# Patient Record
Sex: MALE | Race: WHITE | URBAN - METROPOLITAN AREA
[De-identification: names, ages, dates, MRNs, and addresses within clinical notes are randomized per-mention and may not be internally consistent; named-entity substitution may affect disease eponyms.]

---

## 2017-09-07 ENCOUNTER — TRANSFERRED RECORDS (OUTPATIENT)
Dept: HEALTH INFORMATION MANAGEMENT | Facility: CLINIC | Age: 47
End: 2017-09-07

## 2017-09-07 ENCOUNTER — MEDICAL CORRESPONDENCE (OUTPATIENT)
Dept: HEALTH INFORMATION MANAGEMENT | Facility: CLINIC | Age: 47
End: 2017-09-07

## 2017-09-15 ENCOUNTER — OFFICE VISIT (OUTPATIENT)
Dept: SURGERY | Facility: CLINIC | Age: 47
End: 2017-09-15
Payer: COMMERCIAL

## 2017-09-15 VITALS
SYSTOLIC BLOOD PRESSURE: 120 MMHG | HEART RATE: 87 BPM | HEIGHT: 71 IN | WEIGHT: 223 LBS | DIASTOLIC BLOOD PRESSURE: 80 MMHG | BODY MASS INDEX: 31.22 KG/M2

## 2017-09-15 DIAGNOSIS — N50.89 TESTICULAR LUMP: Primary | ICD-10-CM

## 2017-09-15 PROCEDURE — 99203 OFFICE O/P NEW LOW 30 MIN: CPT | Performed by: SURGERY

## 2017-09-15 NOTE — PROGRESS NOTES
HPI: We are asked by Dr. Reyna to see Mr. Sweeney in consultation concerning a groin lump.   Aure is a 47 year old male who presents for evaluation of left groin lump.  Symptoms began  6 months  ago.  This is described as aching. The pain is not bothersome.  Associated symptoms include none. The patient has noticed a lump. The patient has had a previous herniorrhaphy in this location. Employment does not require heavy lifting.      Cough: No      Past Medical History:   has no past medical history on file.    Past Surgical History:  Past Surgical History:   Procedure Laterality Date     HERNIA REPAIR  As infant           Social History:  Social History     Social History     Marital status:      Spouse name: N/A     Number of children: N/A     Years of education: N/A     Occupational History     Not on file.     Social History Main Topics     Smoking status: Never Smoker     Smokeless tobacco: Not on file     Alcohol use No     Drug use: No     Sexual activity: Not on file     Other Topics Concern     Not on file     Social History Narrative        Family History:  No family history on file.      ROS:  The 10 point review of systems is negative other than noted in the HPI and above.    PE:    General- Well-developed, well-nourished, patient able to stand without difficulty.  HEENT- Normocephalic and atraumatic. Pupils equal and round.  Mucous membranes moist.  Sclera are nonicteric.   Respirations- are regular and non labored  Abdomen is abdomen is soft without significant tenderness, masses, organomegaly or guarding     Umbilicus is non-tender  Hernia- Left inguinal lump is present with valsalva, this reduces with pressure              Right inguinal hernia is not present with valsalva              The hernia is reducible              Testicles are normal   No inguinal lymphadenopathy is present  Neurologic- I find no inguinal neuropathy                  Assesment: Primary, reducible left inguinal lump.  Could be a hernia, lies posterior, could be cord mass.     Plan:    We have discussed the laparoscopic and open options for hernia repair, the choice of observation, reduction techniques, incarceration and strangulation signs, symptoms and importance as well as need to seek emergency treatment.    I will set up a testicular ultrasound to see if this represents a mass or a hernia. If this is not diagnostic, we may get a CT scan  Zhao Macias MD    Please route or send letter to:  Primary Care Provider (PCP)

## 2017-09-15 NOTE — MR AVS SNAPSHOT
"              After Visit Summary   9/15/2017    Aure Sweeney    MRN: [id number]           Patient Information     Date Of Birth          1970        Visit Information        Provider Department      9/15/2017 9:30 AM Zhao Macias MD [facility] Hernia      Care Instructions    Your bilateral testicular u/s is scheduled for 17 3:30 pm at [facility]          Follow-ups after your visit        Who to contact     If you have questions or need follow up information about today's clinic visit or your schedule please contact [facility] directly at [phone].  Normal or non-critical lab and imaging results will be communicated to you by MyChart, letter or phone within 4 business days after the clinic has received the results. If you do not hear from us within 7 days, please contact the clinic through M-Factorhart or phone. If you have a critical or abnormal lab result, we will notify you by phone as soon as possible.  Submit refill requests through GotVoice or call your pharmacy and they will forward the refill request to us. Please allow 3 business days for your refill to be completed.          Additional Information About Your Visit        MyChart Information     GotVoice lets you send messages to your doctor, view your test results, renew your prescriptions, schedule appointments and more. To sign up, go to [email or web address] . Click on \"Log in\" on the left side of the screen, which will take you to the Welcome page. Then click on \"Sign up Now\" on the right side of the page.     You will be asked to enter the access code listed below, as well as some personal information. Please follow the directions to create your username and password.     Your access code is: [email or web address]  Expires: 2017  9:41 AM     Your access code will  in 90 days. If you need help or a new code, please call your [place] clinic or [phone].   " "     Care EveryWhere ID     This is your Care EveryWhere ID. This could be used by other organizations to access your [place] medical records  [id number]        Your Vitals Were     Pulse Height BMI (Body Mass Index)             87 5' 11\" (1.803 m) 31.1 kg/m2          Blood Pressure from Last 3 Encounters:   09/15/17 120/80   02/25/11 132/91   06/18/09 128/80    Weight from Last 3 Encounters:   09/15/17 223 lb (101.2 kg)   02/25/11 231 lb (104.8 kg)   06/18/09 215 lb (97.5 kg)              Today, you had the following     No orders found for display       Primary Care Provider Office Phone # Fax #    Murphy Greco [phone]       [facility] [address] [facility] [address]        Equal Access to Services     GABY CROWDER : Hadii anila greer Soneha, waaxda luqadaha, qaybta kaalmada michelleyada, abdi crespo . So [facility] [phone].    ATENCIÓN: Si habla español, tiene a macias disposición servicios gratuitos de asistencia lingüística. Mayra al [phone].    We comply with applicable federal civil rights laws and [place] laws. We do not discriminate on the basis of race, color, national origin, age, disability sex, sexual orientation or gender identity.            Thank you!     Thank you for choosing [facility]  for your care. Our goal is always to provide you with excellent care. Hearing back from our patients is one way we can continue to improve our services. Please take a few minutes to complete the written survey that you may receive in the mail after your visit with us. Thank you!             Your Updated Medication List - Protect others around you: Learn how to safely use, store and throw away your medicines at www.disposemymeds.org.          This list is accurate as of: 9/15/17  9:41 AM.  Always use your most recent med list.                   Brand Name Dispense Instructions for use Diagnosis    NO ACTIVE MEDICATIONS      . "

## 2017-09-15 NOTE — LETTER
September 15, 2017      RE:  Aure Sweeney-:  [birth year]    HPI: We are asked by Dr. Reyna to see Mr. Sweeney in consultation concerning a groin lump.   Aure is a 47 year old male who presents for evaluation of left groin lump.  Symptoms began  6 months  ago.  This is described as aching. The pain is not bothersome.  Associated symptoms include none. The patient has noticed a lump. The patient has had a previous herniorrhaphy in this location. Employment does not require heavy lifting.      Cough: No      Past Medical History:  Has no past medical history on file.    ROS:  The 10 point review of systems is negative other than noted in the HPI and above.     PE:    General- Well-developed, well-nourished, patient able to stand without difficulty.  HEENT- Normocephalic and atraumatic. Pupils equal and round.  Mucous membranes moist.  Sclera are nonicteric.   Respirations- are regular and non labored  Abdomen is abdomen is soft without significant tenderness, masses, organomegaly or guarding                                               Umbilicus is non-tender  Hernia- Left inguinal lump is present with valsalva, this reduces with pressure              Right inguinal hernia is not present with valsalva              The hernia is reducible              Testicles are normal                        No inguinal lymphadenopathy is present  Neurologic- I find no inguinal neuropathy         Assesment: Primary, reducible left inguinal lump. Could be a hernia, lies posterior, could be cord mass.      Plan:    We have discussed the laparoscopic and open options for hernia repair, the choice of observation, reduction techniques, incarceration and strangulation signs, symptoms and importance as well as need to seek emergency treatment.    I will set up a testicular ultrasound to see if this represents a mass or a hernia. If this is not diagnostic, we may get a CT scan    Zhao Macias MD

## 2017-09-18 ENCOUNTER — TRANSFERRED RECORDS (OUTPATIENT)
Dept: HEALTH INFORMATION MANAGEMENT | Facility: CLINIC | Age: 47
End: 2017-09-18

## 2017-10-25 ENCOUNTER — OFFICE VISIT (OUTPATIENT)
Dept: UROLOGY | Facility: CLINIC | Age: 47
End: 2017-10-25
Payer: COMMERCIAL

## 2017-10-25 VITALS
DIASTOLIC BLOOD PRESSURE: 102 MMHG | BODY MASS INDEX: 32.06 KG/M2 | WEIGHT: 229 LBS | HEIGHT: 71 IN | HEART RATE: 90 BPM | SYSTOLIC BLOOD PRESSURE: 132 MMHG

## 2017-10-25 DIAGNOSIS — N50.89 PERINEAL MASS IN MALE: ICD-10-CM

## 2017-10-25 DIAGNOSIS — L72.9 SCROTAL CYST: Primary | ICD-10-CM

## 2017-10-25 PROCEDURE — 99202 OFFICE O/P NEW SF 15 MIN: CPT | Performed by: UROLOGY

## 2017-10-25 ASSESSMENT — PAIN SCALES - GENERAL: PAINLEVEL: NO PAIN (0)

## 2017-10-25 NOTE — MR AVS SNAPSHOT
After Visit Summary   10/25/2017    Aure Sweeney    MRN: [id number]           Patient Information     Date Of Birth          1970        Visit Information        Provider Department      10/25/2017 2:50 PM Aram Gr MD [facility]        Today's Diagnoses     Scrotal cyst    -  1    Perineal mass in male           Follow-ups after your visit        Your next 10 appointments already scheduled     Oct 30, 2017  6:00 PM CDT   MR PELVIS W/O & W CONTRAST with [id number]   [facility] ([facility])    [address] [place] [id number]   [phone]           Take your medicines as usual, unless your doctor tells you not to. Bring a list of your current medicines to your exam (including vitamins, minerals and over-the-counter drugs).  You will be given intravenous contrast for this exam. To prepare:   The day before your exam, drink extra fluids at least six 8-ounce glasses (unless your doctor tells you to restrict your fluids).   Have a blood test (creatinine test) within 30 days of your exam. Go to your clinic or Diagnostic Imaging Department for this test.  The MRI machine uses a strong magnet. Please wear clothes without metal (snaps, zippers). A sweatsuit works well, or we may give you a hospital gown.  Please remove any body piercings and hair extensions before you arrive. You will also remove watches, jewelry, hairpins, wallets, dentures, partial dental plates and hearing aids. You may wear contact lenses, and you may be able to wear your rings. We have a safe place to keep your personal items, but it is safer to leave them at home.   **IMPORTANT** THE INSTRUCTIONS BELOW ARE ONLY FOR THOSE PATIENTS WHO HAVE BEEN TOLD THEY WILL RECEIVE SEDATION OR GENERAL ANESTHESIA DURING THEIR MRI PROCEDURE:  IF YOU WILL RECEIVE SEDATION (take medicine to help you relax during your exam):   You must get the medicine from your  doctor before you arrive. Bring the medicine to the exam. Do not take it at home.   Arrive one hour early. Bring someone who can take you home after the test. Your medicine will make you sleepy. After the exam, you may not drive, take a bus or take a taxi by yourself.   No eating 8 hours before your exam. You may have clear liquids up until 4 hours before your exam. (Clear liquids include water, clear tea, black coffee and fruit juice without pulp.)  IF YOU WILL RECEIVE ANESTHESIA (be asleep for your exam):   Arrive 1 1/2 hours early. Bring someone who can take you home after the test. You may not drive, take a bus or take a taxi by yourself.   No eating 8 hours before your exam. You may have clear liquids up until 4 hours before your exam. (Clear liquids include water, clear tea, black coffee and fruit juice without pulp.)  Please call the Imaging Department at your exam site with any questions.              Future tests that were ordered for you today     Open Future Orders        Priority Expected Expires Ordered    MR Pelvis w/o & w Contrast Routine  10/25/2018 10/25/2017            Who to contact     If you have questions or need follow up information about today's clinic visit or your schedule please contact [facility] directly at [phone].  Normal or non-critical lab and imaging results will be communicated to you by MyChart, letter or phone within 4 business days after the clinic has received the results. If you do not hear from us within 7 days, please contact the clinic through goTennahart or phone. If you have a critical or abnormal lab result, we will notify you by phone as soon as possible.  Submit refill requests through Current Media or call your pharmacy and they will forward the refill request to us. Please allow 3 business days for your refill to be completed.          Additional Information About Your Visit        Current Media Information     Current Media lets you send  "messages to your doctor, view your test results, renew your prescriptions, schedule appointments and more. To sign up, go to [email or web address] . Click on \"Log in\" on the left side of the screen, which will take you to the Welcome page. Then click on \"Sign up Now\" on the right side of the page.     You will be asked to enter the access code listed below, as well as some personal information. Please follow the directions to create your username and password.     Your access code is: [email or web address]  Expires: 2017  9:41 AM     Your access code will  in 90 days. If you need help or a new code, please call your [place] clinic or [phone].        Care EveryWhere ID     This is your [facility] EveryWhere ID. This could be used by other organizations to access your [place] medical records  [id number]        Your Vitals Were     Pulse Height BMI (Body Mass Index)             90 1.803 m (5' 11\") 31.94 kg/m2          Blood Pressure from Last 3 Encounters:   10/25/17 (!) 132/102   09/15/17 120/80   11 132/91    Weight from Last 3 Encounters:   10/25/17 103.9 kg (229 lb)   09/15/17 101.2 kg (223 lb)   11 104.8 kg (231 lb)              We Performed the Following     UA without Microscopic        Primary Care Provider Office Phone # Fax #    Murphy Greco [phone]       [facility] [address] [facility] [address]        Equal Access to Services     BRITTANY CROWDER : Hadii aad ku hadasho Soneha, waaxda luqadaha, qaybta kaalmada nurys, abdi crespo . So [facility] [phone].    ATENCIÓN: Si habla español, tiene a macias disposición servicios gratuitos de asistencia lingüística. Llame al [phone].    We comply with applicable federal civil rights laws and [place] laws. We do not discriminate on the basis of race, color, national origin, age, disability, sex, sexual orientation, or gender identity.            Thank you!     Thank you for " choosing [facility]  for your care. Our goal is always to provide you with excellent care. Hearing back from our patients is one way we can continue to improve our services. Please take a few minutes to complete the written survey that you may receive in the mail after your visit with us. Thank you!             Your Updated Medication List - Protect others around you: Learn how to safely use, store and throw away your medicines at www.disposemymeds.org.          This list is accurate as of: 10/25/17  3:35 PM.  Always use your most recent med list.                   Brand Name Dispense Instructions for use Diagnosis    NO ACTIVE MEDICATIONS      .

## 2017-10-25 NOTE — PROGRESS NOTES
Aure Sweeney is a 47-year-old male who has noticed a growth in the left perineum for the past 6 months. This is causing no pain but is sensitive when he sits. It has increased in size by about 30% in that time. Ultrasound at [facility] shows normal testicles and cords and a hyperechoic area inferior to the scrotum.  Past medical history: Left inguinal herniorrhaphy as child, vasectomy  Medications: None  Allergies: None  Review of systems: No voiding issues  Exam: Normal appearance, normal vital signs. With spouse. Alert and oriented, normocephalic, normal respirations, neuro grossly intact. Normal scrotum, testicles and cords. Normal phallus. Soft but attached mass in left perineum. Attachment approximates anal sphincter area. Does not attached to area of  diaphragm but may be very close to left corpora cavernosum.  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 Discussed with radiologist-recommend MRI scan of the area with and without gadolinium to know its attachments in a brain structures. Surgical excision would be close to area of anal sphincter and left corpora cavernosum.  Plan: MRI scan with and without contrast early next week-call with results. Patient will probably require perineal excision-if complicated, [facility]'ll [facility] to the [facility], perhaps Jacques Patel M.D.

## 2017-10-25 NOTE — LETTER
10/25/2017      RE: Aure Sweeney  [address] [place] [address]       Aure Sweeney is a 47-year-old male who has noticed a growth in the left perineum for the past 6 months. This is causing no pain but is sensitive when he sits. It has increased in size by about 30% in that time. Ultrasound at [facility] shows normal testicles and cords and a hyperechoic area inferior to the scrotum.  Past medical history: Left inguinal herniorrhaphy as child, vasectomy  Medications: None  Allergies: None  Review of systems: No voiding issues  Exam: Normal appearance, normal vital signs. With spouse. Alert and oriented, normocephalic, normal respirations, neuro grossly intact. Normal scrotum, testicles and cords. Normal phallus. Soft but attached mass in left perineum. Attachment approximates anal sphincter area. Does not attached to area of  diaphragm but may be very close to left corpora cavernosum.  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 Discussed with radiologist-recommend MRI scan of the area with and without gadolinium to know its attachments in a brain structures. Surgical excision would be close to area of anal sphincter and left corpora cavernosum.  Plan: MRI scan with and without contrast early next week-call with results. Patient will probably require perineal excision-if complicated, we'll refer to the [place], perhaps Jacques Patel M.D.    Aram Gr MD

## 2017-10-25 NOTE — NURSING NOTE
Chief Complaint   Patient presents with     Cyst     Patient has a lump on scrotum.     Parish Marie LPN 3:02 PM October 25, 2017

## 2017-10-25 NOTE — LETTER
10/25/2017       RE: Aure Sweeney  [address] [place] [address]     Dear Colleague,    Thank you for referring your patient, Aure Sweeney, to the [facility] [place] at [facility]. Please see a copy of my visit note below.    Aure Sweeney is a 47-year-old male who has noticed a growth in the left perineum for the past 6 months. This is causing no pain but is sensitive when he sits. It has increased in size by about 30% in that time. Ultrasound at [facility] shows normal testicles and cords and a hyperechoic area inferior to the scrotum.  Past medical history: Left inguinal herniorrhaphy as child, vasectomy  Medications: None  Allergies: None  Review of systems: No voiding issues  Exam: Normal appearance, normal vital signs. With spouse. Alert and oriented, normocephalic, normal respirations, neuro grossly intact. Normal scrotum, testicles and cords. Normal phallus. Soft but attached mass in left perineum. Attachment approximates anal sphincter area. Does not attached to area of  diaphragm but may be very close to left corpora cavernosum.  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 Discussed with radiologist-recommend MRI scan of the area with and without gadolinium to know its attachments in a brain structures. Surgical excision would be close to area of anal sphincter and left corpora cavernosum.  Plan: MRI scan with and without contrast early next week-call with results. Patient will probably require perineal excision-if complicated, we'll refer to the [facility], perhaps Jacques Patel M.D.    Again, thank you for allowing me to participate in the care of your patient.      Sincerely,    Aram Gr MD

## 2017-10-30 ENCOUNTER — HOSPITAL ENCOUNTER (OUTPATIENT)
Dept: MRI IMAGING | Facility: CLINIC | Age: 47
Discharge: HOME | End: 2017-10-30
Attending: UROLOGY | Admitting: UROLOGY
Payer: COMMERCIAL

## 2017-10-30 DIAGNOSIS — N50.89 PERINEAL MASS IN MALE: ICD-10-CM

## 2017-10-30 PROCEDURE — 72197 MRI PELVIS W/O & W/DYE: CPT

## 2017-10-30 PROCEDURE — 25000128 H RX IP 250 OP 636: Performed by: UROLOGY

## 2017-10-30 PROCEDURE — A9585 GADOBUTROL INJECTION: HCPCS | Performed by: UROLOGY

## 2017-10-30 RX ORDER — GADOBUTROL 604.72 MG/ML
10 INJECTION INTRAVENOUS ONCE
Status: COMPLETED | OUTPATIENT
Start: 2017-10-30 | End: 2017-10-30

## 2017-10-30 RX ADMIN — GADOBUTROL 10 ML: 604.72 INJECTION INTRAVENOUS at 19:16

## 2017-11-13 ENCOUNTER — PRE VISIT (OUTPATIENT)
Dept: UROLOGY | Facility: CLINIC | Age: 47
End: 2017-11-13

## 2017-12-05 ENCOUNTER — PRE VISIT (OUTPATIENT)
Dept: UROLOGY | Facility: CLINIC | Age: 47
End: 2017-12-05

## 2017-12-05 NOTE — TELEPHONE ENCOUNTER
Patient with a perineal lipoma coming in for a consult. Chart reviewed and pt saw Dr. Gr, imaging is available. No need for a call.

## 2017-12-11 ENCOUNTER — OFFICE VISIT (OUTPATIENT)
Dept: UROLOGY | Facility: CLINIC | Age: 47
End: 2017-12-11
Payer: COMMERCIAL

## 2017-12-11 VITALS
HEART RATE: 95 BPM | HEIGHT: 71 IN | WEIGHT: 241 LBS | SYSTOLIC BLOOD PRESSURE: 153 MMHG | BODY MASS INDEX: 33.74 KG/M2 | DIASTOLIC BLOOD PRESSURE: 112 MMHG

## 2017-12-11 DIAGNOSIS — N50.89 PERINEAL MASS IN MALE: Primary | ICD-10-CM

## 2017-12-11 ASSESSMENT — PAIN SCALES - GENERAL: PAINLEVEL: NO PAIN (0)

## 2017-12-11 NOTE — LETTER
"12/11/2017       RE: Aure Sweeney  [address] [place] [address]     Dear Colleague,    Thank you for referring your patient, Aure Sweeney, to the [facility] UROLOGY AND INST FOR PROSTATE AND UROLOGIC CANCERS at [facility]. Please see a copy of my visit note below.      Name: Aure Sweeney    MRN: [id number]   YOB: 1970                 Chief Complaint:   Perineal mass          History of Present Illness:   Mr. Aure Sweeney is a 47 year old male seen in consultation from Dr. 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  Patient noticed an asymptomatic lump in his left perineum and sought attention.  He has had no trauma to the area and has no skin changes and no pain he has had no change in urination or defecation.           Past Medical History:     Past Medical History:   Diagnosis Date     Hernia, abdominal             Past Surgical History:     Past Surgical History:   Procedure Laterality Date     HERNIA REPAIR  As infant      TESTICLE SURGERY       VASECTOMY              Social History:     Social History   Substance Use Topics     Smoking status: Never Smoker     Smokeless tobacco: Not on file     Alcohol use No            Family History:   No family history on file.           Allergies:   No Known Allergies         Medications:     Current Outpatient Prescriptions   Medication Sig     NO ACTIVE MEDICATIONS .     No current facility-administered medications for this visit.              Review of Systems:    ROS: 14 point ROS neg other than the symptoms noted above in the HPI and PMH.          Physical Exam:   B/P: 153/112, T: Data Unavailable, P: 95, R: Data Unavailable  Estimated body mass index is 33.61 kg/(m^2) as calculated from the following:    Height as of this encounter: 1.803 m (5' 11\").    Weight as of this encounter: 109.3 kg (241 lb).  General: age-appropriate appearing male in NAD. Non-obese body habitus.  Abdomen: mild obesity , soft, " non-distended, non-tender. No organomegaly. Surgical scars include: none  : testicles normal without atrophy or masses, no hernias, penis normal without urethral discharge; there is a mobile fatty feeling mass in the left perineum about half the size of a golf ball. It is more distinct in its boundaries anteriorly and then tapers off posteriorly toward the rectum.   Rectal exam: not done  Skin: clear of rashes or ecchymoses.        Labs:    none      Imaging:    All imaging reviewed with patient.  Significant for findings consistent with a perineal lipoma.  Consistent with the findings and physical exam, the lipoma is more distinct anteriorly and tapers off posteriorly.  Anteriorly can be seen to abut the corpus spongiosum and the corpus cavernosum.  Posteriorly it trails off into an indistinct tail lateral to the rectum.      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 I assured them that at this time findings are consistent with lipoma and not liposarcoma; however, I did tell him I would present this case to tumor board and go over it with Dr. Torsten Reagan who operates on many sarcomas in order to seek a second opinion I will contact the patient by telephone before the Evely[place] holiday.     Jacques Patel MD  December 11, 2017

## 2017-12-11 NOTE — NURSING NOTE
"Chief Complaint   Patient presents with     Consult     perineal lipoma        Blood pressure (!) 153/112, pulse 95, height 1.803 m (5' 11\"), weight 109.3 kg (241 lb). Body mass index is 33.61 kg/(m^2).    Patient Active Problem List   Diagnosis     CARDIOVASCULAR SCREENING; LDL GOAL LESS THAN 160       No Known Allergies    Current Outpatient Prescriptions   Medication Sig Dispense Refill     NO ACTIVE MEDICATIONS .         Social History   Substance Use Topics     Smoking status: Never Smoker     Smokeless tobacco: Not on file     Alcohol use No       LAMAR Chaudhry  12/11/2017  4:25 PM       "

## 2017-12-11 NOTE — MR AVS SNAPSHOT
"              After Visit Summary   2017    Aure Sweeney    MRN: [id number]           Patient Information     Date Of Birth          1970        Visit Information        Provider Department      2017 4:00 PM Jacques Patel MD [facility] Urology and [facility] for Prostate and Urologic Cancers        Today's Diagnoses     Perineal mass in male    -  1       Follow-ups after your visit        Who to contact     Please call your clinic at [phone] to:    Ask questions about your health    Make or cancel appointments    Discuss your medicines    Learn about your test results    Speak to your doctor   If you have compliments or concerns about an experience at your clinic, or if you wish to file a complaint, please contact [facility] Patient Relations at [phone] or email us at [email or web address]         Additional Information About Your Visit        MyChart Information     Biocycle is an electronic gateway that provides easy, online access to your medical records. With Biocycle, you can request a clinic appointment, read your test results, renew a prescription or communicate with your care team.     To sign up for [employer] [place] visit the website at [email or web address]   You will be asked to enter the access code listed below, as well as some personal information. Please follow the directions to create your username and password.     Your access code is: [email or web address]  Expires: 2017  8:41 AM     Your access code will  in 90 days. If you need help or a new code, please contact your [facility] or call [phone] for assistance.        Care EveryWhere ID     This is your Care EveryWhere ID. This could be used by other organizations to access your [place] medical records  [id number]        Your Vitals Were     Pulse Height BMI (Body Mass Index)             95 1.803 m (5' 11\") 33.61 kg/m2          Blood Pressure " from Last 3 Encounters:   12/11/17 (!) 153/112   10/25/17 (!) 132/102   09/15/17 120/80    Weight from Last 3 Encounters:   12/11/17 109.3 kg (241 lb)   10/25/17 103.9 kg (229 lb)   09/15/17 101.2 kg (223 lb)              Today, you had the following     No orders found for display       Primary Care Provider Office Phone # Fax #    Murphy Greco [phone]       [facility] [address] [facility] [address]        Equal Access to Services     [facility] : Hadii anila ku hadasho Soomaali, waaxda luqadaha, qaybta kaalmada adejulio cesaryada, abdi crespo . So [facility] [phone].    ATENCIÓN: Si habla español, tiene a macias disposición servicios gratuitos de asistencia lingüística. Llame al [phone].    We comply with applicable federal civil rights laws and [place] laws. We do not discriminate on the basis of race, color, national origin, age, disability, sex, sexual orientation, or gender identity.            Thank you!     Thank you for choosing [facility] UROLOGY AND [facility] FOR PROSTATE AND UROLOGIC CANCERS  for your care. Our goal is always to provide you with excellent care. Hearing back from our patients is one way we can continue to improve our services. Please take a few minutes to complete the written survey that you may receive in the mail after your visit with us. Thank you!             Your Updated Medication List - Protect others around you: Learn how to safely use, store and throw away your medicines at www.disposemymeds.org.          This list is accurate as of: 12/11/17  6:42 PM.  Always use your most recent med list.                   Brand Name Dispense Instructions for use Diagnosis    NO ACTIVE MEDICATIONS      .

## 2017-12-12 NOTE — PROGRESS NOTES
"  Name: Aure Sweeney    MRN: [id number]   YOB: 1970                 Chief Complaint:   Perineal mass          History of Present Illness:   Mr. Aure Sweeney is a 47 year old male seen in consultation from Dr. 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  Patient noticed an asymptomatic lump in his left perineum and sought attention.  He has had no trauma to the area and has no skin changes and no pain he has had no change in urination or defecation.           Past Medical History:     Past Medical History:   Diagnosis Date     Hernia, abdominal             Past Surgical History:     Past Surgical History:   Procedure Laterality Date     HERNIA REPAIR  As infant      TESTICLE SURGERY       VASECTOMY              Social History:     Social History   Substance Use Topics     Smoking status: Never Smoker     Smokeless tobacco: Not on file     Alcohol use No            Family History:   No family history on file.           Allergies:   No Known Allergies         Medications:     Current Outpatient Prescriptions   Medication Sig     NO ACTIVE MEDICATIONS .     No current facility-administered medications for this visit.              Review of Systems:    ROS: 14 point ROS neg other than the symptoms noted above in the HPI and PMH.          Physical Exam:   B/P: 153/112, T: Data Unavailable, P: 95, R: Data Unavailable  Estimated body mass index is 33.61 kg/(m^2) as calculated from the following:    Height as of this encounter: 1.803 m (5' 11\").    Weight as of this encounter: 109.3 kg (241 lb).  General: age-appropriate appearing male in NAD. Non-obese body habitus.  Abdomen: mild obesity , soft, non-distended, non-tender. No organomegaly. Surgical scars include: none  : testicles normal without atrophy or masses, no hernias, penis normal without urethral discharge; there is a mobile fatty feeling mass in the left perineum about half the size of a golf ball. It is more distinct in its boundaries anteriorly and " then tapers off posteriorly toward the rectum.   Rectal exam: not done  Skin: clear of rashes or ecchymoses.        Labs:    none      Imaging:    All imaging reviewed with patient.  Significant for findings consistent with a perineal lipoma.  Consistent with the findings and physical exam, the lipoma is more distinct anteriorly and tapers off posteriorly.  Anteriorly can be seen to abut the corpus spongiosum and the corpus cavernosum.  Posteriorly it trails off into an indistinct tail lateral to the rectum.      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 I assured them that at this time findings are consistent with lipoma and not liposarcoma; however, I did tell him I would present this case to tumor board and go over it with Dr. Torsten Reagan who operates on many sarcomas in order to seek a second opinion I will contact the patient by telephone before the Evelyne holiday.     Jacques Patel MD  December 11, 2017

## 2017-12-21 ENCOUNTER — TELEPHONE (OUTPATIENT)
Dept: UROLOGY | Facility: CLINIC | Age: 47
End: 2017-12-21

## 2017-12-21 NOTE — PROGRESS NOTES
Discussed with patient that this is likely benign based on discussion with oncology, Dr Reagan and radiology's interpretation. Patient would like to come in to discuss resection.

## 2018-01-29 ENCOUNTER — PRE VISIT (OUTPATIENT)
Dept: UROLOGY | Facility: CLINIC | Age: 48
End: 2018-01-29

## 2018-01-29 NOTE — TELEPHONE ENCOUNTER
Patient with a perineal lipoma coming in to discuss resection. Chart reviewed and all records available. No need for a call.

## 2018-02-05 ENCOUNTER — OFFICE VISIT (OUTPATIENT)
Dept: UROLOGY | Facility: CLINIC | Age: 48
End: 2018-02-05
Payer: COMMERCIAL

## 2018-02-05 ENCOUNTER — ALLIED HEALTH/NURSE VISIT (OUTPATIENT)
Dept: UROLOGY | Facility: CLINIC | Age: 48
End: 2018-02-05
Payer: COMMERCIAL

## 2018-02-05 VITALS
SYSTOLIC BLOOD PRESSURE: 141 MMHG | HEIGHT: 71 IN | DIASTOLIC BLOOD PRESSURE: 95 MMHG | HEART RATE: 75 BPM | WEIGHT: 239.2 LBS | BODY MASS INDEX: 33.49 KG/M2

## 2018-02-05 DIAGNOSIS — D17.30 LIPOMA OF SKIN AND SUBCUTANEOUS TISSUE: Primary | ICD-10-CM

## 2018-02-05 RX ORDER — CEFAZOLIN SODIUM 1 G/3ML
1 INJECTION, POWDER, FOR SOLUTION INTRAMUSCULAR; INTRAVENOUS SEE ADMIN INSTRUCTIONS
Status: CANCELLED | OUTPATIENT
Start: 2018-02-05

## 2018-02-05 ASSESSMENT — PAIN SCALES - GENERAL: PAINLEVEL: NO PAIN (0)

## 2018-02-05 NOTE — PROGRESS NOTES
Pre Op Teaching Flowsheet       Pre and Post op Patient Education  Relevant Diagnosis:  Lipoma of skin and subcutaneous tissue   Teaching Topic:  Pre and post op teaching for removal of lipoma from perineum  Person Involved in teaching:  Aure Sweeney      Motivation Level:  Asks Questions: Yes  Eager to Learn:  Yes  Cooperative: Yes  Receptive (willing/able to accept information):  Yes  Patient demonstrates understanding of the following:  Date and time of surgery:  5/25/18 at 0715  Location of surgery: [facility] [place]- 5th Floor  History and Physical and any other testing necessary prior to surgery: Yes  Required time line for completion of History and Physical and any pre-op testing: Yes    NPO Guidelines: NPO per Anesthesia Guidelines    Patient demonstrates understanding of the following:  Patient understands the need for a responsible adult to drive them home and someone to stay with them for the first 24 hours post-operatively: YES   Pre-op bowel prep: No, not needed  Pre-op showering/scrub information with Hibiclens Soap: Yes  Medications to take the day of surgery:  Per PCP  Blood thinner medications discussed and when to stop (if applicable):  Yes  Diabetes medication management (if applicable):  N/A  Discussed pain control after surgery: pain scale, pain medications and pain management techniques  Infection Prevention: Patient demonstrates understanding of the following:  Patient instructed on hand hygiene:  Yes  Surgical procedure site care taught: Yes  Signs and symptoms of infection taught:  Yes  Wound care will be taught at the time of discharge.  Central venous catheter care will be taught at the time of discharge (if applicable).    Post-op follow-up:  Discussed how to contact the hospital, nurse, and clinic scheduling staff if necessary.    Instructional materials used/given/mailed:  [place] [facility] Booklet, post op teaching sheet, Map, Soap, and arrival/location  information.    Surgical instructions given to patient in clinic: Yes.    Instructional Materials given:  Before your surgery packet , Medications to avoid before surgery , Showering or Bathing instructions before surgery  and What to expect after surgery    Post-op appointment/testing scheduled per MD orders: Yes    Total time with patient: 10 minutes    Sri Perry RN  Urology Care Coordinator

## 2018-02-05 NOTE — MR AVS SNAPSHOT
After Visit Summary   2/5/2018    Aure Sweeney    MRN: [id number]           Patient Information     Date Of Birth          1970        Visit Information        Provider Department      2/5/2018 10:00 AM Jacques Patel MD [facility] Urology and [facility] for Prostate and Urologic Cancers        Today's Diagnoses     Lipoma of skin and subcutaneous tissue    -  1      Care Instructions    Schedule surgery with Dr. Patel.    It was a pleasure meeting with you today.  Thank you for allowing me and my team the privilege of caring for you today.  YOU are the reason we are here, and I truly hope we provided you with the excellent service you deserve.  Please let us know if there is anything else we can do for you so that we can be sure you are leaving completely satisfied with your care experience.              Follow-ups after your visit        Who to contact     Please call your clinic at [phone] to:    Ask questions about your health    Make or cancel appointments    Discuss your medicines    Learn about your test results    Speak to your doctor   If you have compliments or concerns about an experience at your clinic, or if you wish to file a complaint, please contact [facility] Patient Relations at [phone] or email us at [email or web address]         Additional Information About Your Visit        MyChart Information     Immunity Projectt is an electronic gateway that provides easy, online access to your medical records. With [employer] [place] [employer]uttle, you can request a clinic appointment, read your test results, renew a prescription or communicate with your care team.     To sign up for Immunity Projectt visit the website at [email or web address]   You will be asked to enter the access code listed below, as well as some personal information. Please follow the directions to create your username and password.     Your access code is: [email or web address]  Expires: 3/21/2018  9:21 AM     Your  "access code will  in 90 days. If you need help or a new code, please contact your [facility] or call [phone] for assistance.        Care EveryWhere ID     This is your Care EveryWhere ID. This could be used by other organizations to access your [place] medical records  [id number]        Your Vitals Were     Pulse Height BMI (Body Mass Index)             75 1.803 m (5' 11\") 33.36 kg/m2          Blood Pressure from Last 3 Encounters:   18 (!) 141/95   17 (!) 153/112   10/25/17 (!) 132/102    Weight from Last 3 Encounters:   18 108.5 kg (239 lb 3.2 oz)   17 109.3 kg (241 lb)   10/25/17 103.9 kg (229 lb)              We Performed the Following     Lesli-Operative Worksheet  (Urology General)        Primary Care Provider Office Phone # Fax #    Murphy Greco [phone]       [facility] [address] [facility] [address]        Equal Access to Services     [facility] : Hadii aad ku hadasho Soomaali, waaxda luqadaha, qaybta kaalmada adefunmi, abdi crespo . So [facility] [phone].    ATENCIÓN: Si habla español, tiene a macias disposición servicios gratuitos de asistencia lingüística. Mayra al [phone].    We comply with applicable federal civil rights laws and [place] laws. We do not discriminate on the basis of race, color, national origin, age, disability, sex, sexual orientation, or gender identity.            Thank you!     Thank you for choosing [facility] UROLOGY AND [facility] FOR PROSTATE AND UROLOGIC CANCERS  for your care. Our goal is always to provide you with excellent care. Hearing back from our patients is one way we can continue to improve our services. Please take a few minutes to complete the written survey that you may receive in the mail after your visit with us. Thank you!             Your Updated Medication List - Protect others around you: Learn how to safely use, store and throw " away your medicines at www.disposemymeds.org.          This list is accurate as of 2/5/18 10:53 AM.  Always use your most recent med list.                   Brand Name Dispense Instructions for use Diagnosis    NO ACTIVE MEDICATIONS      .

## 2018-02-05 NOTE — LETTER
2/5/2018       RE: Aure Sweeney  [address] [place] [address]     Dear Colleague,    Thank you for referring your patient, Aure Sweeney, to the [facility] UROLOGY AND INST FOR PROSTATE AND UROLOGIC CANCERS at [facility]. Please see a copy of my visit note below.    Mr. Sweeney returns to clinic to discuss surgical excision of the lipoma in his perineum.  Since last office visit I have reviewed his images with both our  oncologists as well as with Dr. Torsten Nayak from orthopedic oncology.  All are in agreement that this looks like a benign lipomatous mass.  The mass is most predominant in the anterior perineum close to the scrotum and then tapers off as it heads back towards Alcock's canal.    The mass bothers him very little but he and his wife are both worried about it growing over the future.  They report that has grown over the last couple of years.  I informed him that there is no urgency to removing the mass but it will likely grow over time and will become more difficult to excise over time.  They would like to proceed with surgery at this time to remove the mass and understand the risks and benefits to include bleeding, infection, changes in erectile function or ejaculatory function, changes in penile sensation or scrotal sensation.  They understand that we will take frozen sections of the mass and its margins at the time of removal and this may affect the surgical approach.    I spent 15 minutes with patient and his wife with greater than half the time in consultation and coordination of care.    Again, thank you for allowing me to participate in the care of your patient.      Sincerely,    Jacques Patel MD

## 2018-02-05 NOTE — MR AVS SNAPSHOT
After Visit Summary   2018    Aure Sweeney    MRN: [id number]           Patient Information     Date Of Birth          1970        Visit Information        Provider Department      2018 11:30 AM Nurse, Claudia Prostate Cancer [facility] Urology and [facility] for Prostate and Urologic Cancers        Today's Diagnoses     Lipoma of skin and subcutaneous tissue    -  1       Follow-ups after your visit        Who to contact     Please call your clinic at [phone] to:    Ask questions about your health    Make or cancel appointments    Discuss your medicines    Learn about your test results    Speak to your doctor   If you have compliments or concerns about an experience at your clinic, or if you wish to file a complaint, please contact [facility] Patient Relations at [phone] or email us at [email or web address]         Additional Information About Your Visit        MyChart Information     CareLuLut is an electronic gateway that provides easy, online access to your medical records. With [employer], you can request a clinic appointment, read your test results, renew a prescription or communicate with your care team.     To sign up for CareLuLut visit the website at [email or web address]   You will be asked to enter the access code listed below, as well as some personal information. Please follow the directions to create your username and password.     Your access code is: [email or web address]  Expires: 3/21/2018  9:21 AM     Your access code will  in 90 days. If you need help or a new code, please contact your [facility] or call [phone] for assistance.        Care EveryWhere ID     This is your Care EveryWhere ID. This could be used by other organizations to access your [place] medical records  [id number]         Blood Pressure from Last 3 Encounters:   18 (!) 141/95   17 (!) 153/112   10/25/17 (!) 132/102     Weight from Last 3 Encounters:   02/05/18 108.5 kg (239 lb 3.2 oz)   12/11/17 109.3 kg (241 lb)   10/25/17 103.9 kg (229 lb)              Today, you had the following     No orders found for display       Primary Care Provider Office Phone # Fax #    Murphy Greco [phone]       [facility] [address] [facility] [address]        Equal Access to Services     GABY CROWDER : Hadii aad ku hadasho Soomaali, waaxda luqadaha, qaybta kaalmada adeegyada, waxay idiin hayaan adeeg americaaragregorio la'zofia . So [facility] [phone].    ATENCIÓN: Si habla español, tiene a macias disposición servicios gratuitos de asistencia lingüística. [facility] [phone].    We comply with applicable federal civil rights laws and [place] laws. We do not discriminate on the basis of race, color, national origin, age, disability, sex, sexual orientation, or gender identity.            Thank you!     Thank you for choosing [facility] UROLOGY AND [facility] FOR PROSTATE AND UROLOGIC CANCERS  for your care. Our goal is always to provide you with excellent care. Hearing back from our patients is one way we can continue to improve our services. Please take a few minutes to complete the written survey that you may receive in the mail after your visit with us. Thank you!             Your Updated Medication List - Protect others around you: Learn how to safely use, store and throw away your medicines at www.disposemymeds.org.          This list is accurate as of 2/5/18 11:33 AM.  Always use your most recent med list.                   Brand Name Dispense Instructions for use Diagnosis    NO ACTIVE MEDICATIONS      .

## 2018-02-05 NOTE — PATIENT INSTRUCTIONS
Schedule surgery with Dr. Patel.    It was a pleasure meeting with you today.  Thank you for allowing me and my team the privilege of caring for you today.  YOU are the reason we are here, and I truly hope we provided you with the excellent service you deserve.  Please let us know if there is anything else we can do for you so that we can be sure you are leaving completely satisfied with your care experience.

## 2018-02-05 NOTE — PROGRESS NOTES
Mr. Sweeney returns to clinic to discuss surgical excision of the lipoma in his perineum.  Since last office visit I have reviewed his images with both our  oncologists as well as with Dr. Torsten Nayak from orthopedic oncology.  All are in agreement that this looks like a benign lipomatous mass.  The mass is most predominant in the anterior perineum close to the scrotum and then tapers off as it heads back towards Alcock's canal.    The mass bothers him very little but he and his wife are both worried about it growing over the future.  They report that has grown over the last couple of years.  I informed him that there is no urgency to removing the mass but it will likely grow over time and will become more difficult to excise over time.  They would like to proceed with surgery at this time to remove the mass and understand the risks and benefits to include bleeding, infection, changes in erectile function or ejaculatory function, changes in penile sensation or scrotal sensation.  They understand that we will take frozen sections of the mass and its margins at the time of removal and this may affect the surgical approach.    I spent 15 minutes with patient and his wife with greater than half the time in consultation and coordination of care.

## 2018-05-22 RX ORDER — LORATADINE 10 MG/1
10 TABLET ORAL PRN
COMMUNITY

## 2018-05-24 ENCOUNTER — ANESTHESIA EVENT (OUTPATIENT)
Dept: SURGERY | Facility: AMBULATORY SURGERY CENTER | Age: 48
End: 2018-05-24

## 2018-05-25 ENCOUNTER — ANESTHESIA (OUTPATIENT)
Dept: SURGERY | Facility: AMBULATORY SURGERY CENTER | Age: 48
End: 2018-05-25

## 2018-05-25 ENCOUNTER — HOSPITAL ENCOUNTER (OUTPATIENT)
Facility: AMBULATORY SURGERY CENTER | Age: 48
End: 2018-05-25
Attending: UROLOGY
Payer: COMMERCIAL

## 2018-05-25 ENCOUNTER — SURGERY (OUTPATIENT)
Age: 48
End: 2018-05-25

## 2018-05-25 VITALS
WEIGHT: 239.3 LBS | HEIGHT: 71 IN | OXYGEN SATURATION: 95 % | BODY MASS INDEX: 33.5 KG/M2 | SYSTOLIC BLOOD PRESSURE: 109 MMHG | RESPIRATION RATE: 12 BRPM | DIASTOLIC BLOOD PRESSURE: 67 MMHG | TEMPERATURE: 98 F

## 2018-05-25 DIAGNOSIS — D17.72 BENIGN LIPOMATOUS NEOPLASM OF OTHER GENITOURINARY ORGAN: Primary | ICD-10-CM

## 2018-05-25 RX ORDER — SODIUM CHLORIDE, SODIUM LACTATE, POTASSIUM CHLORIDE, CALCIUM CHLORIDE 600; 310; 30; 20 MG/100ML; MG/100ML; MG/100ML; MG/100ML
INJECTION, SOLUTION INTRAVENOUS CONTINUOUS
Status: CANCELLED | OUTPATIENT
Start: 2018-05-25

## 2018-05-25 RX ORDER — ONDANSETRON 2 MG/ML
INJECTION INTRAMUSCULAR; INTRAVENOUS PRN
Status: DISCONTINUED | OUTPATIENT
Start: 2018-05-25 | End: 2018-05-25

## 2018-05-25 RX ORDER — ONDANSETRON 4 MG/1
4 TABLET, ORALLY DISINTEGRATING ORAL EVERY 30 MIN PRN
Status: CANCELLED | OUTPATIENT
Start: 2018-05-25

## 2018-05-25 RX ORDER — ONDANSETRON 2 MG/ML
4 INJECTION INTRAMUSCULAR; INTRAVENOUS EVERY 30 MIN PRN
Status: CANCELLED | OUTPATIENT
Start: 2018-05-25

## 2018-05-25 RX ORDER — NALOXONE HYDROCHLORIDE 0.4 MG/ML
.1-.4 INJECTION, SOLUTION INTRAMUSCULAR; INTRAVENOUS; SUBCUTANEOUS
Status: CANCELLED | OUTPATIENT
Start: 2018-05-25 | End: 2018-05-26

## 2018-05-25 RX ORDER — NALOXONE HYDROCHLORIDE 0.4 MG/ML
.1-.4 INJECTION, SOLUTION INTRAMUSCULAR; INTRAVENOUS; SUBCUTANEOUS
Status: DISCONTINUED | OUTPATIENT
Start: 2018-05-25 | End: 2018-05-26 | Stop reason: HOSPADM

## 2018-05-25 RX ORDER — LIDOCAINE HYDROCHLORIDE 20 MG/ML
INJECTION, SOLUTION INFILTRATION; PERINEURAL PRN
Status: DISCONTINUED | OUTPATIENT
Start: 2018-05-25 | End: 2018-05-25

## 2018-05-25 RX ORDER — ACETAMINOPHEN 325 MG/1
975 TABLET ORAL ONCE
Status: COMPLETED | OUTPATIENT
Start: 2018-05-25 | End: 2018-05-25

## 2018-05-25 RX ORDER — DEXAMETHASONE SODIUM PHOSPHATE 4 MG/ML
INJECTION, SOLUTION INTRA-ARTICULAR; INTRALESIONAL; INTRAMUSCULAR; INTRAVENOUS; SOFT TISSUE PRN
Status: DISCONTINUED | OUTPATIENT
Start: 2018-05-25 | End: 2018-05-25

## 2018-05-25 RX ORDER — FENTANYL CITRATE 50 UG/ML
25-50 INJECTION, SOLUTION INTRAMUSCULAR; INTRAVENOUS
Status: CANCELLED | OUTPATIENT
Start: 2018-05-25

## 2018-05-25 RX ORDER — ONDANSETRON 4 MG/1
4 TABLET, ORALLY DISINTEGRATING ORAL EVERY 30 MIN PRN
Status: DISCONTINUED | OUTPATIENT
Start: 2018-05-25 | End: 2018-05-26 | Stop reason: HOSPADM

## 2018-05-25 RX ORDER — AMOXICILLIN 250 MG
1-2 CAPSULE ORAL 2 TIMES DAILY PRN
Qty: 30 TABLET | Refills: 0 | Status: SHIPPED | OUTPATIENT
Start: 2018-05-25

## 2018-05-25 RX ORDER — LIDOCAINE 40 MG/G
CREAM TOPICAL
Status: DISCONTINUED | OUTPATIENT
Start: 2018-05-25 | End: 2018-05-25 | Stop reason: HOSPADM

## 2018-05-25 RX ORDER — MEPERIDINE HYDROCHLORIDE 25 MG/ML
12.5 INJECTION INTRAMUSCULAR; INTRAVENOUS; SUBCUTANEOUS
Status: CANCELLED | OUTPATIENT
Start: 2018-05-25

## 2018-05-25 RX ORDER — FENTANYL CITRATE 50 UG/ML
25-50 INJECTION, SOLUTION INTRAMUSCULAR; INTRAVENOUS EVERY 5 MIN PRN
Status: DISCONTINUED | OUTPATIENT
Start: 2018-05-25 | End: 2018-05-25 | Stop reason: HOSPADM

## 2018-05-25 RX ORDER — ONDANSETRON 2 MG/ML
4 INJECTION INTRAMUSCULAR; INTRAVENOUS EVERY 30 MIN PRN
Status: DISCONTINUED | OUTPATIENT
Start: 2018-05-25 | End: 2018-05-26 | Stop reason: HOSPADM

## 2018-05-25 RX ORDER — BUPIVACAINE HYDROCHLORIDE 2.5 MG/ML
INJECTION, SOLUTION INFILTRATION; PERINEURAL PRN
Status: DISCONTINUED | OUTPATIENT
Start: 2018-05-25 | End: 2018-05-25 | Stop reason: HOSPADM

## 2018-05-25 RX ORDER — OXYCODONE HYDROCHLORIDE 5 MG/1
5-10 TABLET ORAL
Qty: 20 TABLET | Refills: 0 | Status: SHIPPED | OUTPATIENT
Start: 2018-05-25

## 2018-05-25 RX ORDER — PROPOFOL 10 MG/ML
INJECTION, EMULSION INTRAVENOUS PRN
Status: DISCONTINUED | OUTPATIENT
Start: 2018-05-25 | End: 2018-05-25

## 2018-05-25 RX ORDER — FENTANYL CITRATE 50 UG/ML
INJECTION, SOLUTION INTRAMUSCULAR; INTRAVENOUS PRN
Status: DISCONTINUED | OUTPATIENT
Start: 2018-05-25 | End: 2018-05-25

## 2018-05-25 RX ORDER — PROPOFOL 10 MG/ML
INJECTION, EMULSION INTRAVENOUS CONTINUOUS PRN
Status: DISCONTINUED | OUTPATIENT
Start: 2018-05-25 | End: 2018-05-25

## 2018-05-25 RX ORDER — SODIUM CHLORIDE, SODIUM LACTATE, POTASSIUM CHLORIDE, CALCIUM CHLORIDE 600; 310; 30; 20 MG/100ML; MG/100ML; MG/100ML; MG/100ML
INJECTION, SOLUTION INTRAVENOUS CONTINUOUS
Status: DISCONTINUED | OUTPATIENT
Start: 2018-05-25 | End: 2018-05-25 | Stop reason: HOSPADM

## 2018-05-25 RX ORDER — GABAPENTIN 300 MG/1
300 CAPSULE ORAL ONCE
Status: COMPLETED | OUTPATIENT
Start: 2018-05-25 | End: 2018-05-25

## 2018-05-25 RX ORDER — SODIUM CHLORIDE, SODIUM LACTATE, POTASSIUM CHLORIDE, CALCIUM CHLORIDE 600; 310; 30; 20 MG/100ML; MG/100ML; MG/100ML; MG/100ML
INJECTION, SOLUTION INTRAVENOUS CONTINUOUS
Status: DISCONTINUED | OUTPATIENT
Start: 2018-05-25 | End: 2018-05-26 | Stop reason: HOSPADM

## 2018-05-25 RX ORDER — OXYCODONE HYDROCHLORIDE 5 MG/1
5 TABLET ORAL EVERY 4 HOURS PRN
Status: CANCELLED | OUTPATIENT
Start: 2018-05-25

## 2018-05-25 RX ORDER — KETOROLAC TROMETHAMINE 30 MG/ML
INJECTION, SOLUTION INTRAMUSCULAR; INTRAVENOUS PRN
Status: DISCONTINUED | OUTPATIENT
Start: 2018-05-25 | End: 2018-05-25

## 2018-05-25 RX ADMIN — KETOROLAC TROMETHAMINE 30 MG: 30 INJECTION, SOLUTION INTRAMUSCULAR; INTRAVENOUS at 13:22

## 2018-05-25 RX ADMIN — ONDANSETRON 4 MG: 2 INJECTION INTRAMUSCULAR; INTRAVENOUS at 12:36

## 2018-05-25 RX ADMIN — PROPOFOL 125 MCG/KG/MIN: 10 INJECTION, EMULSION INTRAVENOUS at 13:04

## 2018-05-25 RX ADMIN — BUPIVACAINE HYDROCHLORIDE 10 ML: 2.5 INJECTION, SOLUTION INFILTRATION; PERINEURAL at 13:14

## 2018-05-25 RX ADMIN — GABAPENTIN 300 MG: 300 CAPSULE ORAL at 10:15

## 2018-05-25 RX ADMIN — SODIUM CHLORIDE, SODIUM LACTATE, POTASSIUM CHLORIDE, CALCIUM CHLORIDE: 600; 310; 30; 20 INJECTION, SOLUTION INTRAVENOUS at 12:26

## 2018-05-25 RX ADMIN — PROPOFOL 200 MG: 10 INJECTION, EMULSION INTRAVENOUS at 12:33

## 2018-05-25 RX ADMIN — PROPOFOL 150 MCG/KG/MIN: 10 INJECTION, EMULSION INTRAVENOUS at 12:33

## 2018-05-25 RX ADMIN — LIDOCAINE HYDROCHLORIDE 100 MG: 20 INJECTION, SOLUTION INFILTRATION; PERINEURAL at 12:33

## 2018-05-25 RX ADMIN — ACETAMINOPHEN 975 MG: 325 TABLET ORAL at 10:15

## 2018-05-25 RX ADMIN — FENTANYL CITRATE 50 MCG: 50 INJECTION, SOLUTION INTRAMUSCULAR; INTRAVENOUS at 13:13

## 2018-05-25 RX ADMIN — DEXAMETHASONE SODIUM PHOSPHATE 4 MG: 4 INJECTION, SOLUTION INTRA-ARTICULAR; INTRALESIONAL; INTRAMUSCULAR; INTRAVENOUS; SOFT TISSUE at 12:36

## 2018-05-25 RX ADMIN — FENTANYL CITRATE 50 MCG: 50 INJECTION, SOLUTION INTRAMUSCULAR; INTRAVENOUS at 12:36

## 2018-05-25 NOTE — IP AVS SNAPSHOT
MRN:[id number]                      After Visit Summary   5/25/2018    Aure Sweeney    MRN: [id number]           Thank you!     Thank you for choosing [facility] for your care. Our goal is always to provide you with excellent care. Hearing back from our patients is one way we can continue to improve our services. Please take a few minutes to complete the written survey that you may receive in the mail after you visit with us. Thank you!        Patient Information     Date Of Birth          1970        About your hospital stay     You were admitted on:  May 25, 2018 [facility]    You were discharged on:  May 25, 2018       Who to Call     For medical emergencies, please call 911.  For non-urgent questions about your medical care, please call your primary care provider or clinic, [phone]  For questions related to your surgery, please call your surgery clinic        Attending Provider     Provider Specialty    Jacques Patel MD Urology       Primary Care Provider Office Phone # Fax #    Murphy Greco [phone]      After Care Instructions     Discharge Instructions       Activity:  - No strenuous exercise for 6 weeks but walking and stairs are fine. You may gradually return to normal activity and normal lifting over the course of 6 weeks but take care not to strain your incisions.  Use good judgment: if something hurts then don't do it.  - You should not soak in a tub or pool until the catheter is removed but daily showering is important for healing.  - Do not strain with bowel movements.    - Do not drive until you can press the brake pedal quickly and fully without pain.   - Do not operate a motor vehicle while taking narcotic pain medications.     Diet:  You may return to your normal diet that you were taking before surgery.     Wound Care:    -- You will have an incision between your scrotum and anus.  These will be closed with stitches that will dissolve on their own and do not need to be removed.  You will not need any specific bandage on this area, but you may find wearing a small pad in your underwear can help protect from blood staining your underwear. It is recommended that you wear your scrotal support for the first week following surgery to help reduce swelling (please wash scrotal support if it becomes soiled).   You can shower and let the water run over your incisions but you should not scrub them with soap.    Medications:  1) Oxycodone - this is a narcotic pain medication which you should only need for three to five days after surgery.    2) Pericolace (Senna/docusate) - This is a stool softener to take twice daily.  The surgery, pain medications, and bladder spasm medications can lead to constipation.  You can stop or reduce the pericolace if you are having loose stools.  Other over the counter solutions such as prune juice, miralax, fiber products, senna, and dulcolax can also be used.  If you have not had a bowel movement in 3 days start over-the-counter Milk of Magnesia taken twice daily as directed until you have a good result.     Follow-up:  Please call Dr. Patel's nurse-Bev on the Monday after your surgery at [phone] to update him on your progress and so he can answer any questions you have.   Follow up as scheduled with Dr. Patel in the outpatient Urology Clinic. This appointment may be with our Physician's Assistant, or with Dr. Patel's fellow, rather than Dr. Patel. They are part of his team and very experienced in the post-operative care of his patients.      Questions:  If you have any questions here are some phone numbers and emails you can use to reach us:  - Dr. Patel's [place] Delia [phone]  - Nursing phone helpline at the Urology Clinic (8A-5P M-F): [phone] and choose option 3  - Nights or weekends, call [phone] and ask the  to page the  urology resident on call.   - For emergencies, always call 911                  Your next 10 appointments already scheduled     Jun 11, 2018 10:30 AM CDT   (Arrive by 10:15 AM)   Post-Op with Lydia Macias PA-C   [facility] ([facility])    [address]  4th Floor  [facility] [address] [phone]              Further instructions from your care team       [facility]  Home Care Following Anesthesia  For 24 hours after surgery:  1. Get plenty of rest.  A responsible adult must stay with you for at least 24 hours after you leave the surgery center.  2. Do not drive or use heavy equipment.  If you have weakness or tingling, don't drive or use heavy equipment until this feeling goes away.   3. Do not drink alcohol.   4. Avoid strenuous or risky activities.  Ask for help when climbing stairs.  5. You may feel lightheaded.  IF so, sit for a few minutes before standing.  Have someone help you get up.   6. If you have nausea (feel sick to your stomach): Drink only clear liquids such as apple juice, ginger ale, broth or 7-Up.  Rest may also help.  Be sure to drink enough fluids.  Move to a regular diet as you feel able.   7. You may have a slight fever.  Call the doctor if your fever is over 100 F (37.7 C) (taken under the tongue) or lasts longer than 24 hours.  8. You may have a dry mouth, a sore throat, muscle aches or trouble sleeping. These should go away after 24 hours.  9. Do not make important or legal decisions.               Tips for taking pain medications  To get the best pain relief possible, remember these points:    Take pain medications as directed, before pain becomes severe.    Pain medication can upset your stomach: taking it with food may help.    Constipation is a common side effect of pain medication. Drink plenty of  fluids.    Eat foods high in fiber. Take a stool  softener if recommended by your doctor or pharmacist.    Do not drink alcohol, drive or operate machinery while taking pain medications.    Ask about other ways to control pain, such as with heat, ice or relaxation.    Tylenol/Acetaminophen Consumption  To help encourage the safe use of acetaminophen, the makers of TYLENOL  have lowered the maximum daily dose for single-ingredient Extra Strength TYLENOL  (acetaminophen) products sold in the [place] from 8 pills per day (4,000 mg) to 6 pills per day (3,000 mg). The dosing interval has also changed from 2 pills every 4-6 hours to 2 pills every 6 hours.    If you feel your pain relief is insufficient, you may take Tylenol/Acetaminophen in addition to your narcotic pain medication.     Be careful not to exceed 3,000 mg of Tylenol/Acetaminophen in a 24 hour period from all sources.    If you are taking extra strength Tylenol/acetaminophen (500 mg), the maximum dose is 6 tablets in 24 hours.    If you are taking regular strength acetaminophen (325 mg), the maximum dose is 9 tablets in 24 hours.    Call a doctor for any of the followin. Signs of infection (fever, growing tenderness at the surgery site, a large amount of drainage or bleeding, severe pain, foul-smelling drainage, redness, swelling).  2. It has been over 8 to 10 hours since surgery and you are still not able to urinate (pass water).  3. Headache for over 24 hours.  4. Numbness, tingling or weakness the day after surgery (if you had spinal anesthesia).  Your doctor is:  Dr. Jacques King, Prostate and Urology: [phone]                    Or dial [phone] and ask for the resident on call for:  Prostate Urology  For emergency care, call the:  [facility]:  [phone] (TTY for hearing impaired: [phone])                Pending Results     Date and Time Order Name Status Description    2018 1336 Surgical pathology exam In process             Admission Information     Date &  "Time Provider Department Dept. Phone    2018 Jacques Patel MD [facility] [phone]      Your Vitals Were     Blood Pressure Temperature Respirations Height Weight Pulse Oximetry    112/73 97.4  F (36.3  C) (Temporal) 8 1.803 m (5' 11\") 108.5 kg (239 lb 4.8 oz) 97%    BMI (Body Mass Index)                   33.38 kg/m2           [employer] Information     [employer] is an electronic gateway that provides easy, online access to your medical records. With [employer], you can request a clinic appointment, read your test results, renew a prescription or communicate with your care team.     To sign up for [employer] visit the website at [email or web address]   You will be asked to enter the access code listed below, as well as some personal information. Please follow the directions to create your username and password.     Your access code is: [email or web address]  Expires: 2018  1:51 PM     Your access code will  in 90 days. If you need help or a new code, please contact your [facility] or call [phone] for assistance.        Care EveryWhere ID     This is your Care EveryWhere ID. This could be used by other organizations to access your [facility] medical records  [id number]        Equal Access to Services     GABY CROWDER : Hadii anila weir hadasho Socarolinaali, waaxda luqadaha, qaybta kaalmada adeegyada, abdi royal. So [facility] [phone].    ATENCIÓN: Si habla español, tiene a macias disposición servicios gratuitos de asistencia lingüística. Llame al [phone].    We comply with applicable federal civil rights laws and [place] laws. We do not discriminate on the basis of race, color, national origin, age, disability, sex, sexual orientation, or gender identity.               Review of your medicines      START taking        Dose / Directions    oxyCODONE IR 5 MG tablet   Commonly known as:  ROXICODONE   Used for:  Benign " lipomatous neoplasm of other genitourinary organ        Dose:  5-10 mg   Take 1-2 tablets (5-10 mg) by mouth every 3 hours as needed for other (Moderate to Severe Pain)   Quantity:  20 tablet   Refills:  0       senna-docusate 8.6-50 MG per tablet   Commonly known as:  SENOKOT-S;PERICOLACE   Used for:  Benign lipomatous neoplasm of other genitourinary organ        Dose:  1-2 tablet   Take 1-2 tablets by mouth 2 times daily as needed for constipation   Quantity:  30 tablet   Refills:  0         CONTINUE these medicines which have NOT CHANGED        Dose / Directions    loratadine 10 MG tablet   Commonly known as:  CLARITIN   Indication:  as needed for allergies        Dose:  10 mg   Take 10 mg by mouth as needed for allergies   Refills:  0            Where to get your medicines      These medications were sent to [facility] [address] [facility] [address]  [address] [place] [address] [address] [place] [address]    Hours:  TRANSPLANT PHONE NUMBER [phone] Phone:  [phone]     senna-docusate 8.6-50 MG per tablet         Some of these will need a paper prescription and others can be bought over the counter. Ask your nurse if you have questions.     Bring a paper prescription for each of these medications     oxyCODONE IR 5 MG tablet                Protect others around you: Learn how to safely use, store and throw away your medicines at www.disposemymeds.org.        Information about OPIOIDS     PRESCRIPTION OPIOIDS: WHAT YOU NEED TO KNOW   You have a prescription for an opioid (narcotic) pain medicine. Opioids can cause addiction. If you have a history of chemical dependency of any type, you are at a higher risk of becoming addicted to opioids. Only take this medicine after all other options have been tried. Take it for as short a time and as few doses as possible.     Do not:    Drive. If you drive while taking these medicines, you could be arrested for driving under the  influence (DUI).    Operate heavy machinery    Do any other dangerous activities while taking these medicines.     Drink any alcohol while taking these medicines.      Take with any other medicines that contain acetaminophen. Read all labels carefully. Look for the word  acetaminophen  or  Tylenol.  Ask your pharmacist if you have questions or are unsure.    Store your pills in a secure place, locked if possible. We will not replace any lost or stolen medicine. If you don t finish your medicine, please throw away (dispose) as directed by your pharmacist. The [place] Pollution Control Agency has more information about safe disposal: https://www.pca.[facility].mn.us/living-green/managing-unwanted-medications    All opioids tend to cause constipation. Drink plenty of water and eat foods that have a lot of fiber, such as fruits, vegetables, prune juice, apple juice and high-fiber cereal. Take a laxative (Miralax, milk of magnesia, Colace, Senna) if you don t move your bowels at least every other day.              Medication List: This is a list of all your medications and when to take them. Check marks below indicate your daily home schedule. Keep this list as a reference.      Medications           Morning Afternoon Evening Bedtime As Needed    loratadine 10 MG tablet   Commonly known as:  CLARITIN   Take 10 mg by mouth as needed for allergies                                oxyCODONE IR 5 MG tablet   Commonly known as:  ROXICODONE   Take 1-2 tablets (5-10 mg) by mouth every 3 hours as needed for other (Moderate to Severe Pain)                                senna-docusate 8.6-50 MG per tablet   Commonly known as:  SENOKOT-S;PERICOLACE   Take 1-2 tablets by mouth 2 times daily as needed for constipation

## 2018-05-25 NOTE — IP AVS SNAPSHOT
[facility]    [address] [place] [address]    Phone:  [phone]    Fax:  [phone]                                       After Visit Summary   5/25/2018    Aure Sweeney    MRN: [id number]           After Visit Summary Signature Page     I have received my discharge instructions, and my questions have been answered. I have discussed any challenges I see with this plan with the nurse or doctor.    ..........................................................................................................................................  Patient/Patient Representative Signature      ..........................................................................................................................................  Patient Representative Print Name and Relationship to Patient    ..................................................               ................................................  Date                                            Time    ..........................................................................................................................................  Reviewed by Signature/Title    ...................................................              ..............................................  Date                                                            Time

## 2018-05-25 NOTE — ANESTHESIA CARE TRANSFER NOTE
Patient: Aure Sweeney    Procedure(s):  Removal of Lipoma from Perineum - Wound Class: II-Clean Contaminated    Diagnosis: Lipoma  Diagnosis Additional Information: No value filed.    Anesthesia Type:   MAC     Note:  Airway :Face Mask  Patient transferred to:PACU  Comments: VSS and WNL, denies pain, no PONV, report to Judy CRANEHandoff Report: Identifed the Patient, Identified the Reponsible Provider, Reviewed the pertinent medical history, Discussed the surgical course, Reviewed Intra-OP anesthesia mangement and issues during anesthesia, Set expectations for post-procedure period and Allowed opportunity for questions and acknowledgement of understanding      Vitals: (Last set prior to Anesthesia Care Transfer)    CRNA VITALS  5/25/2018 1324 - 5/25/2018 1359      5/25/2018             Pulse: 84    SpO2: 98 %    Resp Rate (observed): 8    Resp Rate (set): 10                Electronically Signed By: SHEEBA Engel CRNA  May 25, 2018  1:59 PM

## 2018-05-25 NOTE — DISCHARGE INSTRUCTIONS
[facility]  Home Care Following Anesthesia  For 24 hours after surgery:  1. Get plenty of rest.  A responsible adult must stay with you for at least 24 hours after you leave the surgery center.  2. Do not drive or use heavy equipment.  If you have weakness or tingling, don't drive or use heavy equipment until this feeling goes away.   3. Do not drink alcohol.   4. Avoid strenuous or risky activities.  Ask for help when climbing stairs.  5. You may feel lightheaded.  IF so, sit for a few minutes before standing.  Have someone help you get up.   6. If you have nausea (feel sick to your stomach): Drink only clear liquids such as apple juice, ginger ale, broth or 7-Up.  Rest may also help.  Be sure to drink enough fluids.  Move to a regular diet as you feel able.   7. You may have a slight fever.  Call the doctor if your fever is over 100 F (37.7 C) (taken under the tongue) or lasts longer than 24 hours.  8. You may have a dry mouth, a sore throat, muscle aches or trouble sleeping. These should go away after 24 hours.  9. Do not make important or legal decisions.               Tips for taking pain medications  To get the best pain relief possible, remember these points:    Take pain medications as directed, before pain becomes severe.    Pain medication can upset your stomach: taking it with food may help.    Constipation is a common side effect of pain medication. Drink plenty of  fluids.    Eat foods high in fiber. Take a stool softener if recommended by your doctor or pharmacist.    Do not drink alcohol, drive or operate machinery while taking pain medications.    Ask about other ways to control pain, such as with heat, ice or relaxation.    Tylenol/Acetaminophen Consumption  To help encourage the safe use of acetaminophen, the makers of TYLENOL  have lowered the maximum daily dose for single-ingredient Extra Strength TYLENOL  (acetaminophen) products sold in the [place] from 8  pills per day (4,000 mg) to 6 pills per day (3,000 mg). The dosing interval has also changed from 2 pills every 4-6 hours to 2 pills every 6 hours.    If you feel your pain relief is insufficient, you may take Tylenol/Acetaminophen in addition to your narcotic pain medication.     Be careful not to exceed 3,000 mg of Tylenol/Acetaminophen in a 24 hour period from all sources.    If you are taking extra strength Tylenol/acetaminophen (500 mg), the maximum dose is 6 tablets in 24 hours.    If you are taking regular strength acetaminophen (325 mg), the maximum dose is 9 tablets in 24 hours.    Call a doctor for any of the followin. Signs of infection (fever, growing tenderness at the surgery site, a large amount of drainage or bleeding, severe pain, foul-smelling drainage, redness, swelling).  2. It has been over 8 to 10 hours since surgery and you are still not able to urinate (pass water).  3. Headache for over 24 hours.  4. Numbness, tingling or weakness the day after surgery (if you had spinal anesthesia).  Your doctor is:  Dr. Jacques King, Prostate and Urology: [phone]                    Or dial [phone] and ask for the resident on call for:  Prostate Urology  For emergency care, call the:  [facility]:  [phone] (TTY for hearing impaired: [phone])

## 2018-05-25 NOTE — OP NOTE
OPERATIVE REPORT  05/25/2018    PREOPERATIVE DIAGNOSIS: Perineal lipoma    POSTOPERATIVE DIAGNOSIS: Perineal lipoma    PROCEDURES:   1. Excision of perineal lipoma    SURGEON:  Jacques Patel MD, MS  ASSISTANT:  Ambrocio Estrada MD (Resident)    Jonny Prather MD (Fellow)    ANESTHEISA: Ceci    ESTIMATED BLOOD LOSS: 5 mL.   SPECIMENS: Perineal lipoma    INDICATIONS: Mr. Aure Sweeney is a 48 year old gentleman with an enlarging perineal mass. 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 He desires elective resection of this. He understood the risks to include but not be limited to bleeding, infection, penile pain or numbness, scrotal pain or numbness, change in erectile or ejaculatory function, lower extremity neuropathy, deep venous thrombosis. He wished to proceed.     DESCRIPTION OF PROCEDURE:   After informed consent was obtained and preoperative antibiotics were given, the patient was taken to the operating room and placed supine on the operating table. General anesthesia was induced. He was intubated. The patient was placed in the high lithotomy position. The perineum was shaved, prepped and draped in the usual sterile fashion.     A vertical midline incision was made in the perineum and carried down through Colle's fascia. The lipoma was encountered lateral to the bulbospongious and its associated vein and nerve on the left side. The lipoma was then carefully dissected posteriorly and inferiorly using a combination of blunt and sharp cautery. Care was taken to avoid damage to any underlying structures. The lipoma tracked inferolaterally into Alcock's canal where it gradually thinned into a narrow stalk. This stalk was ligated using a 2-0 silk tie and was amputated, taking care to achieve hemostasis. The specimen was removed and sent for pathology. The wound was copiously irrigated. Colles fascia was  "closed with a running 3-0 Vicryl suture. Skin was closed with interrupted 4-0 Vicryl sutures.   Xeroform, fluffs and scrotal support were placed. The patient was awakened from anesthesia, transferred to [facility] and then to the postanesthesia care unit in stable condition. There were no complications.      Wt Readings from Last 5 Encounters:   05/25/18 108.5 kg (239 lb 4.8 oz)   02/05/18 108.5 kg (239 lb 3.2 oz)   12/11/17 109.3 kg (241 lb)   10/25/17 103.9 kg (229 lb)   09/15/17 101.2 kg (223 lb)     Ht Readings from Last 2 Encounters:   05/25/18 1.803 m (5' 11\")   02/05/18 1.803 m (5' 11\")     Estimated body mass index is 33.38 kg/(m^2) as calculated from the following:    Height as of this encounter: 1.803 m (5' 11\").    Weight as of this encounter: 108.5 kg (239 lb 4.8 oz).    Ambrocio Estrada MD    "

## 2018-05-25 NOTE — ANESTHESIA POSTPROCEDURE EVALUATION
Patient: Aure Sweeney    Procedure(s):  Removal of Lipoma from Perineum - Wound Class: II-Clean Contaminated    Diagnosis:Lipoma  Diagnosis Additional Information: No value filed.    Anesthesia Type:  MAC    Note:  Anesthesia Post Evaluation    Patient location during evaluation: PACU  Patient participation: Able to fully participate in evaluation  Level of consciousness: awake and alert  Pain management: adequate  Airway patency: patent  Cardiovascular status: hemodynamically stable  Respiratory status: acceptable  Hydration status: stable  PONV: none     Anesthetic complications: None          Last vitals:  Vitals:    05/25/18 1400 05/25/18 1415 05/25/18 1430   BP: 112/73 102/64 109/67   Resp: 8 12 12   Temp: 36.3  C (97.4  F) 36.7  C (98  F) 36.7  C (98  F)   SpO2: 97% 96% 95%         Electronically Signed By: James Agustin MD  May 25, 2018  2:59 PM

## 2018-05-25 NOTE — ANESTHESIA PREPROCEDURE EVALUATION
Anesthesia Evaluation     .             ROS/MED HX    ENT/Pulmonary:  - neg pulmonary ROS     Neurologic:  - neg neurologic ROS     Cardiovascular:  - neg cardiovascular ROS       METS/Exercise Tolerance:     Hematologic:  - neg hematologic  ROS       Musculoskeletal:  - neg musculoskeletal ROS       GI/Hepatic:     (+) GERD Asymptomatic on medication,       Renal/Genitourinary:  - ROS Renal section negative       Endo:  - neg endo ROS       Psychiatric:  - neg psychiatric ROS       Infectious Disease:  - neg infectious disease ROS       Malignancy:      - no malignancy   Other:    - neg other ROS                 Physical Exam  Normal systems: cardiovascular, pulmonary and dental    Airway   Mallampati: I  TM distance: >3 FB  Neck ROM: full    Dental     Cardiovascular   Rhythm and rate: regular and normal      Pulmonary    breath sounds clear to auscultation                    Anesthesia Plan      History & Physical Review  History and physical reviewed and following examination; no interval change.    ASA Status:  2 .    NPO Status:  > 8 hours    Plan for MAC with Propofol and Intravenous induction. Maintenance will be Balanced.  Reason for MAC:  Deep or markedly invasive procedure (G8)  PONV prophylaxis:  Ondansetron (or other 5HT-3) and Dexamethasone or Solumedrol       Postoperative Care  Postoperative pain management:  Oral pain medications and Multi-modal analgesia.      Consents  Anesthetic plan, risks, benefits and alternatives discussed with:  Patient..                          .

## 2018-05-25 NOTE — LETTER
"Aure Sweeney   [address] [facility] [address]        Dear Aure     I am writing you concerning your recent test results which are normal.    Results for orders placed or performed during the hospital encounter of 05/25/18   Surgical pathology exam   Result Value Ref Range    Copath Report       Patient Name: AURE SWEENEY  MR#: [id number]  Specimen #: [id number]  Collected: 5/25/2018  Received: 5/25/2018  Reported: 5/29/2018 18:05  Ordering Phy(s): LILLY BRADLEY    For improved result formatting, select 'View Enhanced Report Format' under   Linked Documents section.    SPECIMEN(S):  Perineal lipoma    FINAL DIAGNOSIS:  Soft tissue, perineal mass, excision:  - Low grade adipocytic neoplasm; see comment    COMMENT:  Given the deep location, large size and subtle nuclear atypia present in   this specimen, FISH study for MDM2  amplification has been ordered to evaluate for the possibility of an   atypical lipomatous tumor/well  differentiated liposarcoma.  The results will be reported in an addendum.    I have personally reviewed all specimens and/or slides, including the   listed special stains, and used them  with my medical judgement to determine or confirm the final diagnosis.    Electronically signed out by:    Sharron Kam M.D., [facility]    CLINICAL HISTORY:  [age] year old male with an enlarging perineal mass.  GROSS:  The specimen is received in formalin with proper patient identification,   labeled \"perineal lipoma\".  The  specimen consists of a 56.8 g, 10.1 x 4.1 x 4.1 cm partially disrupted   yellow-tan fibroadipose tissue mass.  The intact portions of capsule are inked black, and the specimen is   serially sectioned to reveal a homogeneous  yellow tan fibroadipose cut surface with no areas of firmness or   hemorrhage.  Representative sections are  submitted in cassettes A1-A5. (Dictated by: Rosalina Mcclellan 5/25/2018 03:22   PM)    MICROSCOPIC:  Microscopic examination was " performed.    CPT Codes:  A: 91065-KZ6    TESTING LAB LOCATION:  [facility], [address]   [address] [phone]    COLLECTION SITE:  Client: [facility]  Location: OR (B)       FISH   Result Value Ref Range    Copath Report       Patient Name: MEGGAN VAIL  MR#: [id number]  Specimen #: [id number]  Collected: 5/25/2018 13:20  Received: 5/30/2018 11:45  Reported: 6/7/2018 19:57  Ordering Phy(s): LOREN LEMOS  Additional Phy(s): LILLY BRADLEY    For improved result formatting, select 'View Enhanced Report Format' under   Linked Documents section.  __________________________________________    TEST(S) REQUESTED:  FISH Interphase    SPECIMEN DESCRIPTION:  Perineal Lipoma, Paraffin Embedded    CLINICAL COMMENTS:  [id number]    METHODS:  FISH was performed on a paraffin embedded perineal mass with Abbott   Molecular single color probe to MDM2  (12q15) with control probe to D12Z1 (centromere 12).    RESULTS: No evidence of amplification of MDM2    INTERPRETATION:    Avg. number of MDM2 signals/nucleus: 2.0  Avg. number of MORRIS 12 signals/nucleus: 1.9  Amplified Range: MDM2/MORRIS 12 ratio >2.0    Ratio of MDM2/MORRIS 12 signals:   1.0    INTERPRETATION:  None (0%) of the interphase cells examined had a signal pattern  indicative   of amplification of MDM2, as has  been documented in atypical lipomatous tumor/well-differentiated   liposarcoma.    ADDITIONAL COMMENTS:  Analyte Specific Reagents (ASRs) are used in many laboratory tests   necessary for standard medical care and  generally do not require FDA approval.  This test was developed and its   performance characteristics determined  by the [facility]   [facility].  It has not been cleared or  approved by the U.S. Food and Drug Administration.    Electronically Signed Out By:  Whit Rivera  "M.D., [facility] Codes:  A: 04944-DZBP, 71872-CHAPM    TESTING LAB LOCATION:  [facility]   [facility], [address] [address] [phone]    COLLECTION SITE:  Client:  [facility]  Location:  UCOR (B)       Resulted Orders   Surgical pathology exam   Result Value Ref Range    Copath Report       Patient Name: MEGGAN VAIL  MR#: [id number]  Specimen #: [id number]  Collected: 5/25/2018  Received: 5/25/2018  Reported: 5/29/2018 18:05  Ordering Phy(s): LILLY BRADLEY    For improved result formatting, select 'View Enhanced Report Format' under   Linked Documents section.    SPECIMEN(S):  Perineal lipoma    FINAL DIAGNOSIS:  Soft tissue, perineal mass, excision:  - Low grade adipocytic neoplasm; see comment    COMMENT:  Given the deep location, large size and subtle nuclear atypia present in   this specimen, FISH study for MDM2  amplification has been ordered to evaluate for the possibility of an   atypical lipomatous tumor/well  differentiated liposarcoma.  The results will be reported in an addendum.    I have personally reviewed all specimens and/or slides, including the   listed special stains, and used them  with my medical judgement to determine or confirm the final diagnosis.    Electronically signed out by:    Sharron Kma M.D., [facility]    CLINICAL HISTORY:  4 8 year old male with an enlarging perineal mass.  GROSS:  The specimen is received in formalin with proper patient identification,   labeled \"perineal lipoma\".  The  specimen consists of a 56.8 g, 10.1 x 4.1 x 4.1 cm partially disrupted   yellow-tan fibroadipose tissue mass.  The intact portions of capsule are inked black, and the specimen is   serially sectioned to reveal a homogeneous  yellow tan fibroadipose cut surface with no areas of firmness or   hemorrhage.  Representative sections are  submitted in cassettes A1-A5. (Dictated by: " Rosalina Jackard 5/25/2018 03:22   PM)    MICROSCOPIC:  Microscopic examination was performed.    CPT Codes:  A: 56778-JG2    TESTING LAB LOCATION:  [facility], [address]   [address] [phone]    COLLECTION SITE:  Client: [facility]  Location: OR (B)       FISH   Result Value Ref Range    Copath Report       Patient Name: MEGGAN VAIL  MR#: [id number]  Specimen #: [id number]  Collected: 5/25/2018 13:20  Received: 5/30/2018 11:45  Reported: 6/7/2018 19:57  Ordering Phy(s): LOREN LEMOS  Additional Phy(s): LILLY BRADLEY    For improved result formatting, select 'View Enhanced Report Format' under   Linked Documents section.  __________________________________________    TEST(S) REQUESTED:  FISH Interphase    SPECIMEN DESCRIPTION:  Perineal Lipoma, Paraffin Embedded    CLINICAL COMMENTS:  [id number]    METHODS:  FISH was performed on a paraffin embedded perineal mass with Abbott   Molecular single color probe to MDM2  (12q15) with control probe to D12Z1 (centromere 12).    RESULTS: No evidence of amplification of MDM2    INTERPRETATION:    Avg. number of MDM2 signals/nucleus: 2.0  Avg. number of MORRIS 12 signals/nucleus: 1.9  Amplified Range: MDM2/MORRIS 12 ratio >2.0    Ratio of MDM2/MORRIS 12 signals:   1.0    INTERPRETATION:  None (0%) of the interphase cells examined had a signal pattern  indicative   of amplification of MDM2, as has  been documented in atypical lipomatous tumor/well-differentiated   liposarcoma.    ADDITIONAL COMMENTS:  Analyte Specific Reagents (ASRs) are used in many laboratory tests   necessary for standard medical care and  generally do not require FDA approval.  This test was developed and its   performance characteristics determined  by the [facility]   [facility].  It has not been cleared or  approved by the U.S.  Food and Drug Administration.    Electronically Signed Out By:  Whit Rivera M.D., [facility] Codes:  A: 31139-KBAB, 21534-WTHPC    TESTING LAB LOCATION:  [facility]   P[facility], [facility] [address] [place] [address] [phone]    COLLECTION SITE:  Client:  [facility]  Location:  UCOR (B)             I would be happy to see you back in clinic to discuss results in depth if needed.  Please let me know if you have any questions.      Sincerely,      Jacques Patel MD        [facility]  [address]  [address]  [place] [address]  Phone: [phone]  Fax: [phone]

## 2018-05-31 ENCOUNTER — CARE COORDINATION (OUTPATIENT)
Dept: UROLOGY | Facility: CLINIC | Age: 48
End: 2018-05-31

## 2018-05-31 NOTE — PROGRESS NOTES
Urology Postop Phone Note:    Mr. Aure Sweeney is a 48 year old male who underwent Excision of perineal lipoma on 5/25/18 with Dr. Patel for a history of perineal lipoma.  His postoperative course was unremarkable and the patient was d/c to home on 5/31/18.    Left patient voicemail to see how he's doing since his procedure.  Waiting to hear back from patient.      Follow up appointment scheduled on 6/11/18 at 1030 with Lydia Macias PA-C.        Sri Perry, ROYCE  Care Coordinator - Reconstructive Urology

## 2018-06-07 LAB — COPATH REPORT: NORMAL

## 2018-06-11 ENCOUNTER — OFFICE VISIT (OUTPATIENT)
Dept: UROLOGY | Facility: CLINIC | Age: 48
End: 2018-06-11
Payer: COMMERCIAL

## 2018-06-11 VITALS
HEIGHT: 71 IN | WEIGHT: 225 LBS | HEART RATE: 89 BPM | SYSTOLIC BLOOD PRESSURE: 137 MMHG | BODY MASS INDEX: 31.5 KG/M2 | DIASTOLIC BLOOD PRESSURE: 102 MMHG

## 2018-06-11 DIAGNOSIS — N50.89 PERINEAL MASS IN MALE: ICD-10-CM

## 2018-06-11 DIAGNOSIS — D17.30 LIPOMA OF SKIN AND SUBCUTANEOUS TISSUE: Primary | ICD-10-CM

## 2018-06-11 ASSESSMENT — PAIN SCALES - GENERAL: PAINLEVEL: NO PAIN (0)

## 2018-06-11 NOTE — NURSING NOTE
Chief Complaint   Patient presents with     RECHECK     post op for Perineal lipoma       Marci Murray MA

## 2018-06-11 NOTE — LETTER
"6/11/2018       RE: Aure Sweeney  [address] [place] [address]     Dear Colleague,    Thank you for referring your patient, Aure Sweeney, to the [facility] UROLOGY AND INST FOR PROSTATE AND UROLOGIC CANCERS at [facility]. Please see a copy of my visit note below.    UROLOGY POST-OP VISIT:    HPI: Aure Sweeney is a 48 year old male who is 2.5 weeks s/p excision of perineal lipoma with Dr. Patel.    Pain is minimal to none. Very mild swelling in left scrotum which is improving.  Appetite is normal.  Bowel movements are normal.  Urination is normal. Denies dysuria or hematuria.    PEx  BP (!) 137/102  Pulse 89  Ht 1.803 m (5' 11\")  Wt 102.1 kg (225 lb)  BMI 31.38 kg/m2  GEN: well-appearing male in NAD  RESP: no increased respiratory effort  ABD: soft, NT, ND  : Perineal incision clean, dry, intact; sutures intact.  No tenderness or evidence of cellulitis. No hematoma.    LABS:  Pathology was benign.  FINAL DIAGNOSIS:   Soft tissue, perineal mass, excision:   - Low grade adipocytic neoplasm; see comment     COMMENT:   Given the deep location, large size and subtle nuclear atypia present in   this specimen, FISH study for MDM2   amplification has been ordered to evaluate for the possibility of an   atypical lipomatous tumor/well   differentiated liposarcoma.  The results will be reported in an addendum.     TEST(S) REQUESTED:   FISH Interphase     RESULTS: No evidence of amplification of MDM2     INTERPRETATION:     Avg. number of MDM2 signals/nucleus: 2.0   Avg. number of MORRIS 12 signals/nucleus: 1.9   Amplified Range: MDM2/MORRIS 12 ratio >2.0     Ratio of MDM2/MORRIS 12 signals:   1.0     INTERPRETATION:   None (0%) of the interphase cells examined had a signal pattern indicative of amplification of MDM2, as has been documented in atypical lipomatous tumor/well-differentiated liposarcoma.       A/P   Excellent outcome after excision of perineal lipoma.  -Final " pathology is benign. Reviewed with patient in clinic.  -Discussed standard post-op restrictions.     Follow up as needed.    Lydia Macias PA-C  Department of Urology

## 2018-06-11 NOTE — PROGRESS NOTES
"UROLOGY POST-OP VISIT:    HPI: Aure Sweeney is a 48 year old male who is 2.5 weeks s/p excision of perineal lipoma with Dr. Patel.    Pain is minimal to none. Very mild swelling in left scrotum which is improving.  Appetite is normal.  Bowel movements are normal.  Urination is normal. Denies dysuria or hematuria.    PEx  BP (!) 137/102  Pulse 89  Ht 1.803 m (5' 11\")  Wt 102.1 kg (225 lb)  BMI 31.38 kg/m2  GEN: well-appearing male in NAD  RESP: no increased respiratory effort  ABD: soft, NT, ND  : Perineal incision clean, dry, intact; sutures intact.  No tenderness or evidence of cellulitis. No hematoma.    LABS:  Pathology was benign.  FINAL DIAGNOSIS:   Soft tissue, perineal mass, excision:   - Low grade adipocytic neoplasm; see comment     COMMENT:   Given the deep location, large size and subtle nuclear atypia present in   this specimen, FISH study for MDM2   amplification has been ordered to evaluate for the possibility of an   atypical lipomatous tumor/well   differentiated liposarcoma.  The results will be reported in an addendum.     TEST(S) REQUESTED:   FISH Interphase     RESULTS: No evidence of amplification of MDM2     INTERPRETATION:     Avg. number of MDM2 signals/nucleus: 2.0   Avg. number of MORRIS 12 signals/nucleus: 1.9   Amplified Range: MDM2/MORRIS 12 ratio >2.0     Ratio of MDM2/MORRIS 12 signals:   1.0     INTERPRETATION:   None (0%) of the interphase cells examined had a signal pattern indicative of amplification of MDM2, as has been documented in atypical lipomatous tumor/well-differentiated liposarcoma.       A/P   Excellent outcome after excision of perineal lipoma.  -Final pathology is benign. Reviewed with patient in clinic.  -Discussed standard post-op restrictions.     Follow up as needed.    Lydia Macias PA-C  Department of Urology        "

## 2018-06-11 NOTE — MR AVS SNAPSHOT
"              After Visit Summary   2018    Aure Sweeney    MRN: [id number]           Patient Information     Date Of Birth          1970        Visit Information        Provider Department      2018 10:30 AM Lydia Macias PA-C [facility] Urology and [facility] for Prostate and Urologic Cancers        Today's Diagnoses     Lipoma of skin and subcutaneous tissue    -  1    Perineal mass in male           Follow-ups after your visit        Who to contact     Please call your clinic at [phone] to:    Ask questions about your health    Make or cancel appointments    Discuss your medicines    Learn about your test results    Speak to your doctor            Additional Information About Your Visit        MyChart Information     [employer] is an electronic gateway that provides easy, online access to your medical records. With [employer], you can request a clinic appointment, read your test results, renew a prescription or communicate with your care team.     To sign up for [employer] visit the website at [email or web address]   You will be asked to enter the access code listed below, as well as some personal information. Please follow the directions to create your username and password.     Your access code is: [email or web address]  Expires: 2018  1:51 PM     Your access code will  in 90 days. If you need help or a new code, please contact your [facility] or call [phone] for assistance.        Care EveryWhere ID     This is your Care EveryWhere ID. This could be used by other organizations to access your [facility] medical records  [id number]        Your Vitals Were     Pulse Height BMI (Body Mass Index)             89 1.803 m (5' 11\") 31.38 kg/m2          Blood Pressure from Last 3 Encounters:   18 (!) 137/102   18 109/67   18 (!) 141/95    Weight from Last 3 Encounters:   18 102.1 kg (225 lb)   18 108.5 kg (239 lb 4.8 oz)   18 " 108.5 kg (239 lb 3.2 oz)              Today, you had the following     No orders found for display       Primary Care Provider Office Phone # Fax #    Murphy Greco [phone] [address] [facility] [address]        Equal Access to Services     TARABRITTANY LAKESHA : Hadii aad ku hadasho Soomaali, waaxda luqadaha, qaybta kaalmada adeegyada, waxay jamilain amadoun adejulio cesar morris lakelsyernestina . So [facility] [phone].    ATENCIÓN: Si habla español, tiene a macias disposición servicios gratuitos de asistencia lingüística. Llame al [phone].    We comply with applicable federal civil rights laws and [place] laws. We do not discriminate on the basis of race, color, national origin, age, disability, sex, sexual orientation, or gender identity.            Thank you!     Thank you for choosing [facility] UROLOGY AND [facility] FOR PROSTATE AND UROLOGIC CANCERS  for your care. Our goal is always to provide you with excellent care. Hearing back from our patients is one way we can continue to improve our services. Please take a few minutes to complete the written survey that you may receive in the mail after your visit with us. Thank you!             Your Updated Medication List - Protect others around you: Learn how to safely use, store and throw away your medicines at www.disposemymeds.org.          This list is accurate as of 6/11/18 11:03 AM.  Always use your most recent med list.                   Brand Name Dispense Instructions for use Diagnosis    loratadine 10 MG tablet    CLARITIN     Take 10 mg by mouth as needed for allergies        oxyCODONE IR 5 MG tablet    ROXICODONE    20 tablet    Take 1-2 tablets (5-10 mg) by mouth every 3 hours as needed for other (Moderate to Severe Pain)    Benign lipomatous neoplasm of other genitourinary organ       senna-docusate 8.6-50 MG per tablet    SENOKOT-S;PERICOLACE    30 tablet    Take 1-2 tablets by mouth 2 times daily as needed for constipation    Benign lipomatous  neoplasm of other genitourinary organ

## 2018-06-14 LAB — COPATH REPORT: NORMAL

## 2019-01-24 ENCOUNTER — TELEPHONE (OUTPATIENT)
Dept: FAMILY MEDICINE | Facility: CLINIC | Age: 49
End: 2019-01-24

## 2021-01-12 ENCOUNTER — TRANSFERRED RECORDS (OUTPATIENT)
Dept: HEALTH INFORMATION MANAGEMENT | Facility: CLINIC | Age: 51
End: 2021-01-12

## (undated) DEVICE — SU VICRYL 4-0 PS-2 18" UND J496H

## (undated) DEVICE — BLADE KNIFE SURG 15 371115

## (undated) DEVICE — DRSG GAUZE 4X4" TRAY 6939

## (undated) DEVICE — PACK ENT MINOR CUSTOM ASC

## (undated) DEVICE — SUCTION MANIFOLD NEPTUNE 2 SYS 1 PORT 702-025-000

## (undated) DEVICE — ESU ELEC NDL 1" COATED/INSULATED E1465

## (undated) DEVICE — PREP POVIDONE IODINE SOLUTION 10% 120ML

## (undated) DEVICE — DRAPE POUCH INSTRUMENT 1018

## (undated) DEVICE — BLADE CLIPPER SGL USE 9680

## (undated) DEVICE — PREP POVIDONE IODINE SCRUB 7.5% 120ML

## (undated) DEVICE — SYR BULB IRRIG 50ML LATEX FREE 0035280

## (undated) DEVICE — SYR 10ML LL W/O NDL

## (undated) DEVICE — TAPE DURAPORE 2" SILK 1538-2

## (undated) DEVICE — BLADE KNIFE SURG 10 371110

## (undated) DEVICE — DRAPE GYN/UROLOGY FLUID POUCH TUR 29455

## (undated) DEVICE — ESU GROUND PAD ADULT W/CORD E7507

## (undated) DEVICE — PREP SKIN SCRUB TRAY 4461A

## (undated) DEVICE — LINEN TOWEL PACK X6 WHITE 5487

## (undated) DEVICE — LINEN TOWEL PACK X5 5464

## (undated) DEVICE — DRAIN PENROSE 0.50"X18" LATEX FREE GR203

## (undated) DEVICE — PAD CHUX UNDERPAD 30X30"

## (undated) RX ORDER — DEXAMETHASONE SODIUM PHOSPHATE 4 MG/ML
INJECTION, SOLUTION INTRA-ARTICULAR; INTRALESIONAL; INTRAMUSCULAR; INTRAVENOUS; SOFT TISSUE
Status: DISPENSED
Start: 2018-05-25

## (undated) RX ORDER — ONDANSETRON 2 MG/ML
INJECTION INTRAMUSCULAR; INTRAVENOUS
Status: DISPENSED
Start: 2018-05-25

## (undated) RX ORDER — BUPIVACAINE HYDROCHLORIDE 2.5 MG/ML
INJECTION, SOLUTION EPIDURAL; INFILTRATION; INTRACAUDAL
Status: DISPENSED
Start: 2018-05-25

## (undated) RX ORDER — PROPOFOL 10 MG/ML
INJECTION, EMULSION INTRAVENOUS
Status: DISPENSED
Start: 2018-05-25

## (undated) RX ORDER — GABAPENTIN 300 MG/1
CAPSULE ORAL
Status: DISPENSED
Start: 2018-05-25

## (undated) RX ORDER — ACETAMINOPHEN 325 MG/1
TABLET ORAL
Status: DISPENSED
Start: 2018-05-25

## (undated) RX ORDER — LIDOCAINE HYDROCHLORIDE 20 MG/ML
INJECTION, SOLUTION EPIDURAL; INFILTRATION; INTRACAUDAL; PERINEURAL
Status: DISPENSED
Start: 2018-05-25

## (undated) RX ORDER — FENTANYL CITRATE 50 UG/ML
INJECTION, SOLUTION INTRAMUSCULAR; INTRAVENOUS
Status: DISPENSED
Start: 2018-05-25